# Patient Record
Sex: FEMALE | Race: AMERICAN INDIAN OR ALASKA NATIVE
[De-identification: names, ages, dates, MRNs, and addresses within clinical notes are randomized per-mention and may not be internally consistent; named-entity substitution may affect disease eponyms.]

---

## 2018-09-26 ENCOUNTER — HOSPITAL ENCOUNTER (INPATIENT)
Dept: HOSPITAL 5 - TRG | Age: 23
LOS: 3 days | Discharge: HOME | End: 2018-09-29
Attending: OBSTETRICS & GYNECOLOGY | Admitting: OBSTETRICS & GYNECOLOGY
Payer: COMMERCIAL

## 2018-09-26 DIAGNOSIS — E66.9: ICD-10-CM

## 2018-09-26 DIAGNOSIS — Z91.19: ICD-10-CM

## 2018-09-26 DIAGNOSIS — Z3A.35: ICD-10-CM

## 2018-09-26 DIAGNOSIS — D62: ICD-10-CM

## 2018-09-26 LAB
ALT SERPL-CCNC: 13 UNITS/L (ref 7–56)
BACTERIA #/AREA URNS HPF: (no result) /HPF
BASOPHILS # (AUTO): 0 K/MM3 (ref 0–0.1)
BASOPHILS NFR BLD AUTO: 0.4 % (ref 0–1.8)
BENZODIAZEPINES SCREEN,URINE: (no result)
BILIRUB UR QL STRIP: (no result)
BLOOD UR QL VISUAL: (no result)
EOSINOPHIL # BLD AUTO: 0.1 K/MM3 (ref 0–0.4)
EOSINOPHIL NFR BLD AUTO: 1.3 % (ref 0–4.3)
HCT VFR BLD CALC: 38.5 % (ref 30.3–42.9)
HGB BLD-MCNC: 13 GM/DL (ref 10.1–14.3)
LYMPHOCYTES # BLD AUTO: 3.4 K/MM3 (ref 1.2–5.4)
LYMPHOCYTES NFR BLD AUTO: 29.7 % (ref 13.4–35)
MCH RBC QN AUTO: 25 PG (ref 28–32)
MCHC RBC AUTO-ENTMCNC: 34 % (ref 30–34)
MCV RBC AUTO: 73 FL (ref 79–97)
METHADONE SCREEN,URINE: (no result)
MONOCYTES # (AUTO): 1 K/MM3 (ref 0–0.8)
MONOCYTES % (AUTO): 8.5 % (ref 0–7.3)
MUCOUS THREADS #/AREA URNS HPF: (no result) /HPF
OPIATE SCREEN,URINE: (no result)
PH UR STRIP: 5 [PH] (ref 5–7)
PLATELET # BLD: 161 K/MM3 (ref 140–440)
PROT UR STRIP-MCNC: (no result) MG/DL
RBC # BLD AUTO: 5.25 M/MM3 (ref 3.65–5.03)
RBC #/AREA URNS HPF: < 1 /HPF (ref 0–6)
URATE SERPL-MCNC: 4.9 MG/DL (ref 3.5–7.6)
UROBILINOGEN UR-MCNC: < 2 MG/DL (ref ?–2)
WBC #/AREA URNS HPF: < 1 /HPF (ref 0–6)

## 2018-09-26 PROCEDURE — 85014 HEMATOCRIT: CPT

## 2018-09-26 PROCEDURE — 87806 HIV AG W/HIV1&2 ANTB W/OPTIC: CPT

## 2018-09-26 PROCEDURE — 84550 ASSAY OF BLOOD/URIC ACID: CPT

## 2018-09-26 PROCEDURE — 86592 SYPHILIS TEST NON-TREP QUAL: CPT

## 2018-09-26 PROCEDURE — G0463 HOSPITAL OUTPT CLINIC VISIT: HCPCS

## 2018-09-26 PROCEDURE — 99211 OFF/OP EST MAY X REQ PHY/QHP: CPT

## 2018-09-26 PROCEDURE — 82565 ASSAY OF CREATININE: CPT

## 2018-09-26 PROCEDURE — 36415 COLL VENOUS BLD VENIPUNCTURE: CPT

## 2018-09-26 PROCEDURE — 76819 FETAL BIOPHYS PROFIL W/O NST: CPT

## 2018-09-26 PROCEDURE — C1765 ADHESION BARRIER: HCPCS

## 2018-09-26 PROCEDURE — 85025 COMPLETE CBC W/AUTO DIFF WBC: CPT

## 2018-09-26 PROCEDURE — 80307 DRUG TEST PRSMV CHEM ANLYZR: CPT

## 2018-09-26 PROCEDURE — 81001 URINALYSIS AUTO W/SCOPE: CPT

## 2018-09-26 PROCEDURE — 86901 BLOOD TYPING SEROLOGIC RH(D): CPT

## 2018-09-26 PROCEDURE — 86900 BLOOD TYPING SEROLOGIC ABO: CPT

## 2018-09-26 PROCEDURE — 86850 RBC ANTIBODY SCREEN: CPT

## 2018-09-26 PROCEDURE — 83615 LACTATE (LD) (LDH) ENZYME: CPT

## 2018-09-26 PROCEDURE — 84460 ALANINE AMINO (ALT) (SGPT): CPT

## 2018-09-26 PROCEDURE — 76816 OB US FOLLOW-UP PER FETUS: CPT

## 2018-09-26 PROCEDURE — 88307 TISSUE EXAM BY PATHOLOGIST: CPT

## 2018-09-26 PROCEDURE — 84450 TRANSFERASE (AST) (SGOT): CPT

## 2018-09-26 PROCEDURE — 85018 HEMOGLOBIN: CPT

## 2018-09-26 RX ADMIN — HYDROMORPHONE HYDROCHLORIDE PRN MG: 1 INJECTION, SOLUTION INTRAMUSCULAR; INTRAVENOUS; SUBCUTANEOUS at 21:10

## 2018-09-26 RX ADMIN — SODIUM CHLORIDE, SODIUM LACTATE, POTASSIUM CHLORIDE, AND CALCIUM CHLORIDE SCH MLS/HR: .6; .31; .03; .02 INJECTION, SOLUTION INTRAVENOUS at 18:47

## 2018-09-26 RX ADMIN — SODIUM CHLORIDE, SODIUM LACTATE, POTASSIUM CHLORIDE, AND CALCIUM CHLORIDE SCH MLS/HR: .6; .31; .03; .02 INJECTION, SOLUTION INTRAVENOUS at 17:04

## 2018-09-26 RX ADMIN — HYDROMORPHONE HYDROCHLORIDE PRN MG: 1 INJECTION, SOLUTION INTRAMUSCULAR; INTRAVENOUS; SUBCUTANEOUS at 20:49

## 2018-09-26 NOTE — ANESTHESIA CONSULTATION
Anesthesia Consult and Med Hx


Date of service: 09/26/18





- Airway


Anesthetic Teeth Evaluation: Good


ROM Head & Neck: Adequate


Mental/Hyoid Distance: Adequate


Mallampati Class: Class II


Intubation Access Assessment: Possibly Difficult (braces, no rubber band)





- Pulmonary Exam


CTA: Yes





- Cardiac Exam


Cardiac Exam: RRR





- Pre-Operative Health Status


ASA Pre-Surgery Classification: ASA2


Proposed Anesthetic Plan: General





- Pre-Anesthesia Comment


Pre-Anesthesia Comments: Last ate solids approx. 1 hr ago. Will plan RSI as c-

section is urgent/emergent.





- Pulmonary


Hx Smoking: No


Hx Asthma: No


COPD: No


Hx Pneumonia: No





- Cardiovascular System


Hx Hypertension: Yes (gHTN)


Hx Heart Attack/AMI: No





- Central Nervous System


Hx Neuromuscular Disorder: No


Hx Seizures: No


CVA: No


Hx Psychiatric Problems: No





- Endocrine


Hx Renal Disease: No


Hx Liver Disease: No


Hx Insulin Dependent Diabetes: No


Hx Thyroid Disease: No





- Other Systems


Hx Obesity: Yes





- Additional Comments


Anesthesia Medical History Comments: C/s for unfavorable FHT. Hx gHTN, previous 

IUFD @ 32wk. No hx anesthetic complications.

## 2018-09-26 NOTE — HISTORY AND PHYSICAL REPORT
History of Present Illness


Date of examination: 18


Chief complaint: 


 sent from office d/t nonreassuring NST @ 35wk5; hx 32week IUFD with last 

pregnancy.





History of present illness: 


EDC Confirmation:  10/26/2018 by 1st trimester u/s





Past Pregnancy History 


   :      4


   Term Births:      0


   Premature Births:   1


   Living Children:   0


   Para:      1


   Mult. Births:      0


   Prev :   0


   Prev.  attempt?   none


   Aborta:      2


   Elect. Ab:      2


   Spont. Ab:      0


   Ectopics:      0





Pregnancy # 1


   Delivery date:     


   Delivery type:     EAB


   Comments:      D & C





Pregnancy # 2


   Delivery date:     


   Delivery type:     EAB





Pregnancy # 3


   Delivery date:     06/15/2015


   Weeks Gestation:   32


   Delivery type:     


   Hours of labor:     6


   Anesthesia type:     IV


   Delivery location:     [facility]


   Birth weight:      ?


   Comments:      induction for IUFD








Risk Factors: 





Smoked Tobacco Use:  Never smoker


Smokeless Tobacco Use:  Never


Passive smoke exposure:  no


Drug use:  yes


   Substance:  marijuana


HIV high-risk behavior:  low risk


Alcohol use:  no





Dietary Counseling: pn yes








Past Medical History:


   None


   Negative Past Medical History


Family History Summary: 


   Reviewed history and no changes required: 2018


Mother (biol.) - Has Family History of Hypertension - Entered On: 2015


Sister (full) - Has Family History of Cervical Cancer - Entered On: 2015


Other family member - Has No Family History of Breast Cancer - Entered On: 2015


Other family member - Has No Family History of Colon Cancer - Entered On: 2015


Other family member - Has No Family History of Ovarvian Cancer - Entered On: 2015


Other family member - Has No Family History of DVT/PE on OCP - Entered On: 2015








Social History:


   Patient is single


   Works at [employer]


   


   Smoking History:


   Patient has never smoked.








Past Medical History 


Abnormal PAP: negative


RITO Exposure: negative


Infertility: negative


Uterine Anomaly: negative


Uterine Surgery (not C/S): positive, D & C


Other Gynecologic Problems: negative





Social Hx: Patient is single


Works at [employer]





Smoking History:


Patient has never smoked.








Infection History 


Hx of STD: chlamydia


HIV Risk Eval: low risk


Hepatitis B Risk Eval: low risk


Personal hx. of genital herpes: no





Genetic History 


 Congenital Heart Defect:


    Mom: no  Dad: no


Canavan Disease:


    Mom: no  Dad: no


Thalassemia


    Mom: no  Dad: no


Neural Tube Defect


    Mom: no  Dad: no


Down's Syndrome


    Mom: no  Dad: no


Walt-Sachs


    Mom: no  Dad: no


Sickle Cell Disease/Trait


    Mom: no  Dad: no


Hemophilia


    Mom: no  Dad: no


Muscular Dystrophy


    Mom: no  Dad: no


Cystic Fibrosis


    Mom: no  Dad: no


Anshul Chorea


    Mom: no  Dad: no


Mental Retardation


    Mom: no  Dad: no


Fragile X


    Mom: no  Dad: no


Other Genetic/Chromosomal Disorder


    Mom: no  Dad: no


Child w/other birth defect


    Mom: no  Dad: no





Enviromental Exposures 


Xray Exposure: no


Chemical/Other Exposure: no


Exposure to Cat Liter: no


Hx of Parvovirus (Fifth Disease): no


FALSECurrent Allergies (reviewed today):


* BEE STINGS (Critical)








Past History


Past Medical History: other (see HPI)


Past Surgical History: other (see HPI)


GYN History: other (see HPI)


Family/Genetic History: other (see HPI)


Social history: single





- Obstetrical History


Expected Date of Delivery: 10/26/18


Actual Gestation: 35 Week(s) 5 Day(s) 


: 4


Para: 1


Hx # Term Pregnancies: 0


Number of  Pregnancies: 1 (32 week IUFD)


Spontaneous Abortions: 0


Induced : 2


Number of Living Children: 0





Medications and Allergies


 Allergies











Allergy/AdvReac Type Severity Reaction Status Date / Time


 


bee pollen Allergy  Swelling Verified 18 13:52











 Home Medications











 Medication  Instructions  Recorded  Confirmed  Last Taken  Type


 


No Known Home Medications [No  18 Unknown History





Reported Home Medications]     











Active Meds: 


Active Medications





Lactated Ringer's (Lactated Ringers)  1,000 mls @ 125 mls/hr IV AS DIRECT HAYDEN


Lactated Ringer's (Lactated Ringers)  1,000 mls @ 999 mls/hr IV BOLUS ONE


   Stop: 18 14:10











Review of Systems


All systems: negative





- Vital Signs


Vital signs: 


 Vital Signs











Pulse BP


 


 77   120/69 


 


 18 02:20  18 02:20








 











Temp Pulse Resp BP Pulse Ox


 


    56 L     191/101   100 


 


    18 13:46     18 13:46  18 13:42














- Physical Exam


Breasts: Positive: normal


Cardiovascular: Regular rate


Lungs: Positive: Clear to auscultation, Normal air movement


Abdomen: Positive: normal appearance, soft


Genitourinary (Female): Positive: normal external genitalia, normal perenium


Vulva: both: normal


Vagina: Positive: normal moisture


Uterus: Positive: normal size, normal contour


Anus/Rectum: Positive: normal perianal skin


Extremities: Positive: normal


Deep Tendon Reflex Grade: Normal +2





- Obstetrical


FHR: category 2


Uterine Contraction Monitor Mode: External


Uterine Contraction Pattern: Irregular


Uterine Tone Measurement Phase: Resting


Uterine Contraction Intensity: Mild





Results


Result Diagrams: 


 18 13:00





 18 14:18


All other labs normal.








Assessment and Plan


 24y/o  @ 35+5 weeks admitted for non reassuring FHT. GBS pending. elevated 

b/p noted in triage - pre-e labs normal with only trace proteinuria. BPP 8/8, 

vertex, waiting on efw/ramírez. Plan to admit for further observation with 

continuous monitoring. 








- Patient Problems


(1) 35 weeks gestation of pregnancy


Current Visit: Yes   Status: Acute   





(2) History of IUFD


Current Visit: Yes   Status: Acute   





(3) Non-reassuring electronic fetal monitoring tracing


Current Visit: Yes   Status: Acute   





(4) Noncompliant pregnant patient in third trimester


Current Visit: Yes   Status: Acute   


Plan to address problem: 


Patient has missed several visits to [facility] and our office d/t transportation 

issures. Last visit to [facility] 18 - EFW 12th% (3lbs 14oz) RAMÍREZ 11.








(5) Elevated blood pressure affecting pregnancy in third trimester, antepartum


Current Visit: Yes   Status: Acute

## 2018-09-26 NOTE — EVENT NOTE
Date: 18





FHT's reviewed, minimum variability, suspicious decels, occasional lates. No 

accel with scalp stimulation, cervix /3, posterior, medium. BPP 8/10 

however with nonreassuring FHT's, history of 32 week IUFD, noncompliance in 

this pregnancy and remote from delivery with unfavorable cervix will proceed 

delivery because risks for poor, fatal outcome is higher than risks associated 

with prematurity. Options for delivery discussed, risks explained, doubt fetus 

will tolerate labor, recommend proceeding c/s. Risks explained, questions 

answered, consents reviewed and signed, she voiced understanding an agrees to 

proceed with  delivery

## 2018-09-26 NOTE — ULTRASOUND REPORT
FINAL REPORT



EXAM:  US OB FETAL BPP WO NON-STRESS



HISTORY:  efw/aiyana/bpp 



TECHNIQUE:  Transabdominal sonography of the pelvis.



PRIORS:  None.



FINDINGS:  

Biophysical profile:



Fetal breathing movements: 2/2



Fetal movements: 2/2



Fetal posture and tone: 2/2



Qualitative amniotic fluid volume: 2/2



Total: 8/8



Fetal heart rate 144 beats per minute. 



IMPRESSION:  

1. Biophysical profile as noted above.

## 2018-09-26 NOTE — EVENT NOTE
Date: 09/26/18





Sent from office for suspicious deceleration on NST. Late deceleration noted 

with initial contractions however resolved with IVF hydration, states she has 

not eaten since last pm, BPP in progress, elevated BP's noted.  Plan of care to 

be determine after BPP completed: induction vs evaluation for elevated BP's

## 2018-09-26 NOTE — OPERATIVE REPORT
Operative Report


Operative Report: 





Date:      2018





Preoperative diagnosis:   1.  Intrauterine pregnancy at 35 weeks


         2.  Nonreassuring fetal heart rate tracing, category 3 fetal heart 

tones


      `   3.  History of 32 week IUFD


         4.  Noncompliance


         5.  Gestational hypertension





Postoperative diagnosis:   1.  Intrauterine pregnancy at 35 weeks


         2.  Nonreassuring fetal heart rate tracing, category 3 fetal heart 

tones


      `   3.  History of 32 week IUFD


         4.  Noncompliance


         5.  Gestational hypertension





Procedure:      Low uterine transverse incision for  delivery





Surgeon:                      Tamara Almendarez MD





Assistant:      MERLY Betancourt CNM





Anesthesia:      Gen. endotracheal anesthesia





Anesthesiologist:   Dr. Francis





Estimated blood loss:   600 mL





Urine out:      50 mL





Findings:      Live born male  infant.  Weight 4 lbs. 10 oz.  Apgars 8 at 1 

minute and 9 at 5 minutes.  Uterus grossly normal, tubes grossly normal, 

ovaries grossly normal.  Placenta appeared to be a grade 3 with calcifications 

and small clots. 





Procedure:      After risk, benefits, complications, consequences and 

alternatives for this procedure were discussed with patient and consents were 

reviewed and signed, she was taken to the OR where general endotracheal 

anesthesia was induced.  She was then placed in the left lateral tilt position, 

and prepped and draped in the usual sterile fashion.  Timeout was performed, 

and an appropriate level of anesthesia was noted, a Pfannenstiel incision was 

made and extended to the fascia which was incised and extended in the lateral 

directions.  The overlying fascia was sharply dissected away from the 

underlying rectus muscles in the superior and inferior directions.  The midline 

was entered bluntly.  The vesicouterine fold was incised and with blunt 

dissection the bladder flap was created.  A transverse incision was made in the 

lower uterine segment and extended in superiolateral direction with finger 

fractionation.  Copious clear fluid was noted.  The infant was delivered from 

cephalic position.  Mouth and nose were bulb suctioned.  Spontaneous cry and 

excellent tone were noted.  Cord was doubly clamped and cut.  The infant was 

given to /resuscitation team present.  The placenta was manually 

extracted.  The uterus was then exteriorized and cleared of any further 

products of conception or placental tissue.  The incision was reapproximated 

using 0 Vicryl in a running interlocking stitch.  Grossly normal uterus, tubes 

and ovaries were noted.  Once hemostasis was noted, the uterus was allowed back 

into the pelvic cavity.  The pelvis was irrigated with warm normal saline.  

Again hemostasis was noted .  Surgicel applied for further hemostasis.  

Interceed was then placed to prevent adhesions.  Then attention was turned to 

the rectus muscles.  The rectus muscles reapproximated using 0 Vicryl in a 

simple interrupted stitch x 3. Once hemostasis was noted,  the fascia was 

reapproximated using 0 Vicryl  running stitch fashion.  Once hemostasis was 

noted skin incision was reapproximated using 4-0 Vicryl on a Rogelio needle in a 

subcuticular manner.





Counts were correct 3.  Patient tolerated procedure well state recovery room 

in stable condition.

## 2018-09-26 NOTE — EVENT NOTE
Date: 09/26/18


 Spoke with UMBERTO Oliveira - states patient is continuing to have late decels with 

ctx despite IVF bolus in progress and o2 via facemask. Pre-e labs drawn. Dr. Almendarez made aware and will go view tracing.

## 2018-09-27 LAB
HCT VFR BLD CALC: 28.9 % (ref 30.3–42.9)
HGB BLD-MCNC: 9.9 GM/DL (ref 10.1–14.3)

## 2018-09-27 RX ADMIN — KETOROLAC TROMETHAMINE SCH MG: 30 INJECTION, SOLUTION INTRAMUSCULAR at 00:17

## 2018-09-27 RX ADMIN — KETOROLAC TROMETHAMINE SCH MG: 30 INJECTION, SOLUTION INTRAMUSCULAR at 14:47

## 2018-09-27 RX ADMIN — CEFAZOLIN SCH MLS/HR: 330 INJECTION, POWDER, FOR SOLUTION INTRAMUSCULAR; INTRAVENOUS at 01:15

## 2018-09-27 RX ADMIN — OXYCODONE AND ACETAMINOPHEN PRN TAB: 5; 325 TABLET ORAL at 08:37

## 2018-09-27 RX ADMIN — OXYCODONE AND ACETAMINOPHEN PRN TAB: 5; 325 TABLET ORAL at 21:47

## 2018-09-27 RX ADMIN — OXYCODONE AND ACETAMINOPHEN PRN TAB: 5; 325 TABLET ORAL at 14:47

## 2018-09-27 RX ADMIN — CEFAZOLIN SCH MLS/HR: 330 INJECTION, POWDER, FOR SOLUTION INTRAMUSCULAR; INTRAVENOUS at 22:00

## 2018-09-27 NOTE — PROGRESS NOTE
Assessment and Plan





- Patient Problems


(1)  delivery delivered


Onset Date: ~18   Current Visit: Yes   Status: Acute   


Plan to address problem: 


Pt awake No c/o voiced BP post c/s 140-130/90-80 Afebrile Pt has been OOB to 

toilet FF @ umb Lochia small Dressing D&I H&H pending Doing well 12hr s/p 

 section P: continue pathway Advance diet and activity as tolerated.








Subjective





- Subjective


Date of service: 18 (resting; no voiced c/o)


Principal diagnosis: Day 1 (12hours) s/p primary  section


Patient reports: voiding normally, pain well controlled, ambulating normally


: doing well





Objective





- Vital Signs


Latest vital signs: 


 Vital Signs











  Temp Pulse Resp BP BP Pulse Ox


 


 18 02:00  98.5 F  82  18   120/80 


 


 18 00:47    16   


 


 18 00:17    20   


 


 18 22:00  98.7 F  84  20   130/80 


 


 18 21:24   109 H  19   135/87  97


 


 18 21:10    18   


 


 18 21:09  98.8 F  109 H  18   137/92  97


 


 18 20:54   105 H  16   146/91  97


 


 18 20:49    17   


 


 18 20:39   101 H  22   134/88  99


 


 18 20:24   109 H  17   142/86 


 


 18 20:09  97.6 F  105 H  16   143/92  98


 


 18 18:08   106 H     79 L


 


 18 18:06   93 H   128/79  


 


 18 18:05   94 H     99


 


 18 18:00   74     100


 


 18 17:55   106 H     99


 


 18 17:50   63     99


 


 18 17:49   66     87


 


 18 17:47   90   120/74  


 


 18 17:45   85     99


 


 18 17:40   80     99


 


 18 17:35   74     98


 


 18 17:30   64     98


 


 18 17:27   69   129/82  


 


 18 17:25   89     99


 


 18 17:20   74     97


 


 18 17:15   74     99


 


 18 17:10   76     98


 


 18 17:07   63   155/94  


 


 18 17:05   60     98


 


 18 17:00   55 L     99


 


 18 16:55   66     99


 


 18 16:50   57 L     99


 


 18 16:47   54 L   136/88  


 


 18 16:45   56 L     99


 


 18 16:40   51 L     99


 


 18 16:36   51 L   156/95  


 


 18 16:35   54 L     99


 


 18 16:30   54 L     99


 


 18 16:26   54 L   175/102  


 


 18 16:25   55 L     98


 


 18 16:22  96.7 F L     


 


 18 16:15   56 L     100


 


 18 16:10   54 L     99


 


 18 16:05   61     99


 


 18 16:00   54 L     99


 


 18 15:55   55 L     99


 


 18 15:50   56 L     99


 


 18 15:45   65     100


 


 18 15:40   54 L     100


 


 18 15:35   52 L     99


 


 18 15:30   54 L     100


 


 18 15:25   52 L     99


 


 18 15:07   65     99


 


 18 15:02   61     98


 


 18 14:57   53 L     99


 


 18 14:52   57 L     99


 


 18 14:47   59 L     99


 


 18 14:42   67     99


 


 18 14:32   59 L     99


 


 18 14:27   61     99


 


 18 14:22   54 L     99


 


 18 14:17   57 L     100


 


 18 14:12   57 L     99


 


 18 14:07   57 L     100


 


 18 14:02   59 L     99


 


 18 14:00   61   169/94  


 


 18 13:57   61     99


 


 18 13:52   57 L     100


 


 18 13:48   56 L   175/86  


 


 18 13:47   58 L     100


 


 18 13:46   56 L   191/101  


 


 18 13:42   75     100


 


 18 13:37   52 L     100


 


 18 13:32   66     100


 


 18 13:27   60     100


 


 18 13:15   60     99


 


 18 13:10   58 L     99


 


 18 13:05   56 L     98


 


 18 13:00   63     99


 


 18 12:55   59 L     98


 


 18 12:50   59 L   152/92   99


 


 18 12:45   55 L     99


 


 18 12:40   54 L     99


 


 18 12:35   54 L     98


 


 18 12:30   53 L     98








 Intake and Output











 18





 14:59 22:59 06:59


 


Intake Total  2514.583 


 


Output Total  100 700


 


Balance  2414.583 -700


 


Intake:   


 


  IV  2514.583 


 


    Lactated Ringers 1,000 ml  214.583 





    @ 125 mls/hr IV AS   





    DIRECT HAYDEN Rx#:[id number]   


 


    Lactated Ringers 1,000 ml  1000 





    @ 999 mls/hr IV BOLUS   





    ONE Rx#:[id number]   


 


Output:   


 


  Urine  100 700


 


    Indwelling Catheter   700


 


Other:   


 


  Total, Output Amount   200


 


  Weight 179 lb  


 


  Estimated Blood Loss  600 








 Patient Weight











 18





 06:59


 


Weight 179 lb














- Exam


Breasts: Present: normal


Cardiovascular: Present: Regular rate


Lungs: Present: Normal air movement


Abdomen: Present: normal appearance, soft, normal bowel sounds


Uterus: Present: normal, firm, fundal height at umbilicus


Extremities: Present: normal, edema (LE bilateral)


Incision: Present: normal, dry, intact, dressed (to be removed)





- Labs


Labs: 


 Abnormal lab results











  18 Range/Units





  13:00 14:18 


 


WBC  11.6 H   (4.5-11.0)  K/mm3


 


RBC  5.25 H   (3.65-5.03)  M/mm3


 


MCV  73 L   (79-97)  fl


 


MCH  25 L   (28-32)  pg


 


RDW  15.3 H   (13.2-15.2)  %


 


Mono % (Auto)  8.5 H   (0.0-7.3)  %


 


Mono #  1.0 H   (0.0-0.8)  K/mm3


 


Creatinine   0.5 L  (0.7-1.2)  mg/dL


 


Lactate Dehydrogenase   231 H  ()  units/L

## 2018-09-27 NOTE — QUERY-ANEMIA
Dear _Wilder_______________  Date:_____18_______   



/CDS:____jose___________  Phone#:___[phone]______



Exercise your independent professional judgment when responding to this  query.
  

Questions asked do not imply a particular answer is desired or expected. We 
greatly appreciate your clarification on this issue.           

Clinical Documentation States:

Operative Report: 



Date:      2018



Preoperative diagnosis:   1.  Intrauterine pregnancy at 35 weeks

         2.  Nonreassuring fetal heart rate tracing, category 3 fetal heart 
tones

      `   3.  History of 32 week IUFD

         4.  Noncompliance

         5.  Gestational hypertension



Postoperative diagnosis:   1.  Intrauterine pregnancy at 35 weeks

         2.  Nonreassuring fetal heart rate tracing, category 3 fetal heart 
tones

      `   3.  History of 32 week IUFD

         4.  Noncompliance

         5.  Gestational hypertension



Procedure:      Low uterine transverse incision for  delivery



Surgeon:                      Tamara Almendarez MD



Assistant:      MERLY Betancourt CNM



Anesthesia:      Gen. endotracheal anesthesia



Anesthesiologist:   Dr. Francis



Estimated blood loss:   600 mL

     

 



  

Clinical Findings Show:



                                           18



Hb                                         13.0                    9.9



Hct                                        38.5                  28.9         



Etiology:

  [x ]   Anemia due to acute blood loss                                        
                                                                               
                                                       

  [ ]   Anemia due to chronic blood loss 

  [ ]   Anemia secondary to ESRD

  [ ]   Anemia  secondary to neoplastic disease

  [ ]   Iron deficiency anemia due to malabsorption

  [ ]   GI Bleed from:_________________

  [ ]   Anemia of chronic disease ,Other:_____________

  [ x]   Precipitous  Drop in Hemoglobin

  [x ]   Precipitous Drop in Hematocrit 

  [ ]   Other:____________________

  [ ]   Unable to determine

  [ ]   Comment/Explanation:____________







Present on Admission:   [ ] Yes (Y)    [ ] Clinically undeterminable (W)    [x 
] No (N) 



Please also document response in your Progress Notes and/or Discharge Summary 
and 

indicate if the condition was present on admission.
GRACIA

## 2018-09-28 RX ADMIN — OXYCODONE AND ACETAMINOPHEN PRN TAB: 5; 325 TABLET ORAL at 05:42

## 2018-09-28 RX ADMIN — OXYCODONE AND ACETAMINOPHEN PRN TAB: 5; 325 TABLET ORAL at 18:41

## 2018-09-28 RX ADMIN — OXYCODONE AND ACETAMINOPHEN PRN TAB: 5; 325 TABLET ORAL at 11:57

## 2018-09-28 RX ADMIN — IBUPROFEN PRN MG: 800 TABLET, FILM COATED ORAL at 00:34

## 2018-09-28 RX ADMIN — IBUPROFEN PRN MG: 800 TABLET, FILM COATED ORAL at 18:42

## 2018-09-28 NOTE — EVENT NOTE
Date: 09/28/18


Placenta pathology report reviewed, NICU Charge Nurse Simran informed and 

advised to have neonatology review path report for potential sequela for 

infant. CN states she would show MD.

## 2018-09-28 NOTE — PROGRESS NOTE
Assessment and Plan


 patient doing well, no complaints. incision dressed (not removed per patient 

request, wants to wait for shower this morning) - dressing dry and intact.  

lochia scant, fundus firm, H&H stable 9.9/28.9. Patient reports walking to NICU 

several times per day to see infant. encouraged pumping breast q3h and adequate 

hydration. Patient requesting d/c home tomorrow. 








- Patient Problems


(1)  delivery delivered


Onset Date: ~18   Current Visit: Yes   Status: Acute   





Subjective





- Subjective


Date of service: 18


Principal diagnosis: Day 2 s/p primary  section


Interval history: 


EDC Confirmation:  10/26/2018 by 1st trimester u/s





Past Pregnancy History 


   :      4


   Term Births:      0


   Premature Births:   1


   Living Children:   0


   Para:      1


   Mult. Births:      0


   Prev :   0


   Prev.  attempt?   none


   Aborta:      2


   Elect. Ab:      2


   Spont. Ab:      0


   Ectopics:      0





Pregnancy # 1


   Delivery date:     


   Delivery type:     EAB


   Comments:      D & C





Pregnancy # 2


   Delivery date:     


   Delivery type:     EAB





Pregnancy # 3


   Delivery date:     06/15/2015


   Weeks Gestation:   32


   Delivery type:     


   Hours of labor:     6


   Anesthesia type:     IV


   Delivery location:     [facility]


   Birth weight:      ?


   Comments:      induction for IUFD








Risk Factors: 





Smoked Tobacco Use:  Never smoker


Smokeless Tobacco Use:  Never


Passive smoke exposure:  no


Drug use:  yes


   Substance:  marijuana


HIV high-risk behavior:  low risk


Alcohol use:  no





Dietary Counseling: pn yes








Past Medical History:


   None


   Negative Past Medical History


Family History Summary: 


   Reviewed history and no changes required: 2018


Mother (biol.) - Has Family History of Hypertension - Entered On: 2015


Sister (full) - Has Family History of Cervical Cancer - Entered On: 2015


Other family member - Has No Family History of Breast Cancer - Entered On: 2015


Other family member - Has No Family History of Colon Cancer - Entered On: 2015


Other family member - Has No Family History of Ovarvian Cancer - Entered On: 2015


Other family member - Has No Family History of DVT/PE on OCP - Entered On: 2015








Social History:


   Patient is single


   Works at [employer]


   


   Smoking History:


   Patient has never smoked.








Past Medical History 


Abnormal PAP: negative


RITO Exposure: negative


Infertility: negative


Uterine Anomaly: negative


Uterine Surgery (not C/S): positive, D & C


Other Gynecologic Problems: negative





Social Hx: Patient is single


Works at [employer]





Smoking History:


Patient has never smoked.








Infection History 


Hx of STD: chlamydia


HIV Risk Eval: low risk


Hepatitis B Risk Eval: low risk


Personal hx. of genital herpes: no





Genetic History 


 Congenital Heart Defect:


    Mom: no  Dad: no


Canavan Disease:


    Mom: no  Dad: no


Thalassemia


    Mom: no  Dad: no


Neural Tube Defect


    Mom: no  Dad: no


Down's Syndrome


    Mom: no  Dad: no


Walt-Sachs


    Mom: no  Dad: no


Sickle Cell Disease/Trait


    Mom: no  Dad: no


Hemophilia


    Mom: no  Dad: no


Muscular Dystrophy


    Mom: no  Dad: no


Cystic Fibrosis


    Mom: no  Dad: no


Cole Chorea


    Mom: no  Dad: no


Mental Retardation


    Mom: no  Dad: no


Fragile X


    Mom: no  Dad: no


Other Genetic/Chromosomal Disorder


    Mom: no  Dad: no


Child w/other birth defect


    Mom: no  Dad: no





Enviromental Exposures 


Xray Exposure: no


Chemical/Other Exposure: no


Exposure to Cat Liter: no


Hx of Parvovirus (Fifth Disease): no


FALSECurrent Allergies (reviewed today):


* BEE STINGS (Critical)





Patient reports: appetite normal, voiding normally, pain well controlled, flatus

, ambulating normally, no dizzy ambulation, no nauseated


[place]: in NICU





Objective





- Vital Signs


Latest vital signs: 


 Vital Signs











  Temp Pulse Resp BP Pulse Ox


 


 18 05:42    18  


 


 18 01:34    16  


 


 18 00:34    16  


 


 18 00:32  98.6 F  77  20  127/71  95


 


 18 22:47    18  


 


 18 21:47    18  








 Intake and Output











 18





 23:59 07:59 15:59


 


Other:   


 


  # Voids   


 


    Indwelling Catheter 1  














- Exam


Breasts: Present: normal


Cardiovascular: Present: Regular rate


Lungs: Present: Clear to auscultation, Normal air movement


Abdomen: Present: normal appearance, soft


Vulva: both: normal


Uterus: Present: normal, firm, fundal height at umbilicus


Extremities: Present: normal


Incision: Present: normal, dry, dressed (request to remove dressing in shower 

this morning. )

## 2018-09-29 VITALS — DIASTOLIC BLOOD PRESSURE: 80 MMHG | SYSTOLIC BLOOD PRESSURE: 130 MMHG

## 2018-09-29 RX ADMIN — OXYCODONE AND ACETAMINOPHEN PRN TAB: 5; 325 TABLET ORAL at 03:04

## 2018-09-29 RX ADMIN — IBUPROFEN PRN MG: 800 TABLET, FILM COATED ORAL at 03:06

## 2018-09-29 NOTE — DISCHARGE SUMMARY
Providers





- Providers


Date of Admission: 


18 15:50





Date of discharge: 18


Attending physician: 


STACEY MAZARIEGOS





 





18 22:30


Consult to Lactation Consultant [CONS] Routine 


   Reason For Exam: 











Primary care physician: 


YOLI MÁRQUEZ








Hospitalization


Reason for admission:  section,  labor, other (see H&P and Op 

note)


Delivery: 


Procedure:  section


Incision: normal, dry, intact


Other postpartum procedures: none


Discharge diagnosis:  delivery


Hospital course: 


See notes


No complaints, minimal bleeding, fundus firm below umbilicus. Lungs CTAB, abd 

soft NT, exts no edema,NT'


Patient desires d/c home today


The placental pathology report was explained to her.


Condition at discharge: Good


Disposition: DC-01 TO HOME OR SELFCARE





Plan





- Discharge Medications


Prescriptions: 


Ibuprofen [Motrin 800 MG tab] 800 mg PO TID PRN #30 tablet


 PRN Reason: Pain


Lidocain2.5%/Prilocai2.5% [Emla] 5 gm TP ONCE #1 tube


oxyCODONE /ACETAMINOPHEN [Percocet 5/325 mg] 1 - 2 tab PO Q4HR PRN #30 tablet


 PRN Reason: Pain





- Provider Discharge Summary


Activity: routine, no sex for 6 weeks, no heavy lifting 4 weeks, no strenuous 

exercise, other (check you blood pressure twice a day, call the office if the 

top number is 160 or greater or the bottom number is 100 or greater; any 

problems or concerns. )


Diet: routine


Additional instructions: 


[]  Smoking cessation referral if applicable(refer to patient education folder 

for contact #)


[]  Refer to [facility] Booklet








Call your doctor immediately for:


* Fever > 100.5


* Heavy vaginal bleeding ( >1 pad per hour)


* Severe persistent headache


* Shortness of breath


* Reddened, hot, painful area to leg or breast


* Drainage or odor from incision.





* Keep incision clean and dry at all times and follow doctor's instructions 

regarding bathing/showering





You may have a condition that causes you to develop clots in your legs or lungs 

that could kill you and also may be the cause of the previous stillborn and 

complications associated with this pregnancy. You must be tested to determine 

if you have a coagulopathy prior to taking hormonal contraception or getting 

pregnant again.  





- Follow up plan


Follow up: 


YOLI MÁRQUEZ MD [Primary Care Provider] - 7 Days

## 2020-01-08 ENCOUNTER — HOSPITAL ENCOUNTER (EMERGENCY)
Dept: HOSPITAL 5 - ED | Age: 25
Discharge: HOME | End: 2020-01-08
Payer: COMMERCIAL

## 2020-01-08 VITALS — DIASTOLIC BLOOD PRESSURE: 78 MMHG | SYSTOLIC BLOOD PRESSURE: 126 MMHG

## 2020-01-08 DIAGNOSIS — Z91.030: ICD-10-CM

## 2020-01-08 DIAGNOSIS — Z79.899: ICD-10-CM

## 2020-01-08 DIAGNOSIS — F17.200: ICD-10-CM

## 2020-01-08 DIAGNOSIS — I10: ICD-10-CM

## 2020-01-08 DIAGNOSIS — J10.1: Primary | ICD-10-CM

## 2020-01-08 DIAGNOSIS — Z98.890: ICD-10-CM

## 2020-01-08 DIAGNOSIS — Z79.1: ICD-10-CM

## 2020-01-08 LAB
BACTERIA #/AREA URNS HPF: (no result) /HPF
BILIRUB UR QL STRIP: (no result)
BLOOD UR QL VISUAL: (no result)
MUCOUS THREADS #/AREA URNS HPF: (no result) /HPF
PH UR STRIP: 5 [PH] (ref 5–7)
RBC #/AREA URNS HPF: 3 /HPF (ref 0–6)
UROBILINOGEN UR-MCNC: < 2 MG/DL (ref ?–2)
WBC #/AREA URNS HPF: 2 /HPF (ref 0–6)

## 2020-01-08 PROCEDURE — 81001 URINALYSIS AUTO W/SCOPE: CPT

## 2020-01-08 PROCEDURE — 81025 URINE PREGNANCY TEST: CPT

## 2020-01-08 PROCEDURE — 71046 X-RAY EXAM CHEST 2 VIEWS: CPT

## 2020-01-08 RX ADMIN — BENZONATATE ONE MG: 100 CAPSULE ORAL at 15:09

## 2020-01-08 RX ADMIN — IBUPROFEN ONE MG: 800 TABLET, FILM COATED ORAL at 13:14

## 2020-01-08 NOTE — XRAY REPORT
CHEST 2 VIEWS 



INDICATION / CLINICAL INFORMATION:

cough.



COMPARISON: 

None available.



FINDINGS:



SUPPORT DEVICES: None.



HEART / MEDIASTINUM: No significant abnormality. 



LUNGS / PLEURA: No significant pulmonary or pleural abnormality. No pneumothorax. 



ADDITIONAL FINDINGS: No significant additional findings.



IMPRESSION:

1. No acute findings.



Signer Name: Jefe Garg MD 

Signed: 1/8/2020 1:00 PM

 Workstation Name: [id number]

## 2020-01-08 NOTE — EMERGENCY DEPARTMENT REPORT
Upper Respiratory HPI





- HPI


Duration: 1 week


URI Symptoms: Rhinorrhea: Yes, Sore Throat: No, Ear Pain: No, Cough: Yes, 

Shortness of Breath: No, Sick Contacts: No, Unable to Take Fluids: No, Urine 

Output Abnormal: No, Listless Behavior: No


Other History: This is a 25-year-old female nontoxic, well nourished in 

appearance, no acute signs of distress presents to the ED with c/o of productive

cough, chills, body aches, rhinorrhea, nasal congestion x1 week.  Patient also 

has a secondary complaint of dysuria and foul odor of urine.  Patient denies any

flank pain or back pain.  Denies any other urinary symptoms.  Denies any 

abdominal or pelvic pain.  Patient describes productive cough as yellow mucus 

production.  Patient agrees to sick contact with child which has the flu.  

Patient denies any recent travels, long car, recent hospital stays.  Patient 

denies any calf pain or calf tenderness.  Patient denies any chest pain, short 

of breath, fever, nausea, vomiting, hemoptysis, numbness, tingling, headache or 

stiff neck.  Patient denies any drug allergies or significant past medical 

history.





<EUSEBIO RODRIGUEZ - Last Filed: 20 14:32>





<JAZZ CUELLO - Last Filed: 01/10/20 07:31>





- HPI


Chief Complaint: Upper Respiratory Infection


Stated Complaint: BODY ACHES/CHILLS


Time Seen by Provider: 20 12:20





- Home Meds and Allergies


Home Medications: 


                                  Previous Rx's











 Medication  Instructions  Recorded  Last Taken  Type


 


Ibuprofen [Motrin 800 MG tab] 800 mg PO TID PRN #30 tablet 18 Unknown Rx


 


Lidocain2.5%/Prilocai2.5% [Emla] 5 gm TP ONCE #1 tube 18 Unknown Rx


 


oxyCODONE /ACETAMINOPHEN [Percocet 1 - 2 tab PO Q4HR PRN #30 tablet 18 

Unknown Rx





5/325 mg]    


 


Benzonatate [Tessalon Perles] 100 mg PO Q8HR PRN #20 capsule 20 Unknown Rx


 


Ibuprofen [Motrin] 600 mg PO Q8H PRN #20 tablet 20 Unknown Rx


 


Sulfamethoxazole/Trimethoprim 1 each PO BID #14 tablet 20 Unknown Rx





[Bactrim DS TAB]    











Allergies/Adverse Reactions: 


                                    Allergies











Allergy/AdvReac Type Severity Reaction Status Date / Time


 


bee pollen Allergy  Swelling Verified 18 13:52














ED Review of Systems


ROS: 


Stated complaint: BODY ACHES/CHILLS


Other details as noted in HPI





Constitutional: chills.  denies: fever


Eyes: denies: eye pain, eye discharge, vision change


ENT: congestion.  denies: ear pain, throat pain


Respiratory: cough.  denies: shortness of breath, wheezing


Cardiovascular: denies: chest pain, palpitations


Endocrine: no symptoms reported


Gastrointestinal: denies: abdominal pain, nausea, vomiting, diarrhea


Genitourinary: urgency, dysuria, frequency.  denies: hematuria, discharge


Musculoskeletal: denies: back pain, joint swelling, arthralgia


Skin: denies: rash, lesions


Neurological: denies: headache, weakness, paresthesias


Psychiatric: denies: anxiety, depression


Hematological/Lymphatic: denies: easy bleeding, easy bruising





<EUSEBIO RODRIGUEZ - Last Filed: 20 14:32>


ROS: 


Stated complaint: BODY ACHES/CHILLS


Other details as noted in HPI








<JAZZ CUELLO P - Last Filed: 01/10/20 07:31>





ED Past Medical Hx





- Past Medical History


Previous Medical History?: Yes


Hx Hypertension: Yes ([place])


Hx Heart Attack/AMI: No


Hx Congestive Heart Failure: No


Hx Diabetes: No


Hx Deep Vein Thrombosis: No


Hx Liver Disease: No


Hx Renal Disease: No


Hx Sickle Cell Disease: No


Hx Seizures: No


Hx Asthma: No


Hx COPD: No


Hx HIV: No





- Surgical History


Past Surgical History?: Yes


Additional Surgical History:  x 2-3.  





- Social History


Smoking Status: Current Every Day Smoker


Substance Use Type: None





<EUSEBIO RODRIGUEZ - Last Filed: 20 14:32>





<JAZZ CUELLO P - Last Filed: 01/10/20 07:31>





- Medications


Home Medications: 


                                Home Medications











 Medication  Instructions  Recorded  Confirmed  Last Taken  Type


 


Ibuprofen [Motrin 800 MG tab] 800 mg PO TID PRN #30 tablet 18  Unknown Rx


 


Lidocain2.5%/Prilocai2.5% [Emla] 5 gm TP ONCE #1 tube 09/26/18  Unknown Rx


 


oxyCODONE /ACETAMINOPHEN [Percocet 1 - 2 tab PO Q4HR PRN #30 tablet 18  

Unknown Rx





5/325 mg]     


 


Benzonatate [Tessalon Perles] 100 mg PO Q8HR PRN #20 capsule 20  Unknown 

Rx


 


Ibuprofen [Motrin] 600 mg PO Q8H PRN #20 tablet 20  Unknown Rx


 


Sulfamethoxazole/Trimethoprim 1 each PO BID #14 tablet 20  Unknown Rx





[Bactrim DS TAB]     














ED Bronchiolitis Physical Exam





- Exam


General: 


Vital signs noted. No distress. Alert and acting appropriately.





Neurologic: 


Alert and oriented, no deficits.








Musculoskeletal: 


Unremarkable.











<EUSEBIO RODRIGUEZ - Last Filed: 20 14:32>





- Exam


General: 


Vital signs noted. No distress. Alert and acting appropriately.





Neurologic: 


Alert and oriented, no deficits.








Musculoskeletal: 


Unremarkable.











<JAZZ CUELLO - Last Filed: 01/10/20 07:31>





ED Bronchiolitis Tests





- Testing


Testing: CXR: Normal/Negative





<EUSEBIO RODRIGUEZ - Last Filed: 20 14:32>





ED Physical Exam





- General


Limitations: No Limitations


General appearance: alert, in no apparent distress





- Head


Head exam: Present: atraumatic, normocephalic





- Neck


Neck exam: Present: normal inspection, full ROM.  Absent: tenderness, 

meningismus, lymphadenopathy





- Respiratory


Respiratory exam: Present: normal lung sounds bilaterally.  Absent: respiratory 

distress, wheezes, rales, rhonchi, stridor, chest wall tenderness, accessory 

muscle use, decreased breath sounds, prolonged expiratory





- Cardiovascular


Cardiovascular Exam: Present: regular rate, normal rhythm, normal heart sounds. 

Absent: bradycardia, tachycardia, irregular rhythm, systolic murmur, diastolic 

murmur, rubs, gallop





- GI/Abdominal


GI/Abdominal exam: Present: soft, normal bowel sounds.  Absent: distended, 

tenderness, guarding, rebound, rigid, diminished bowel sounds





- Extremities Exam


Extremities exam: Present: normal inspection, full ROM





- Back Exam


Back exam: Present: normal inspection, full ROM.  Absent: tenderness, CVA 

tenderness (R), CVA tenderness (L), muscle spasm, paraspinal tenderness, 

vertebral tenderness, rash noted





- Neurological Exam


Neurological exam: Present: alert, oriented X3, normal gait





- Psychiatric


Psychiatric exam: Present: normal affect, normal mood





- Skin


Skin exam: Present: warm, dry, intact, normal color.  Absent: rash





<EUSEBIO RODRIGUEZ - Last Filed: 20 14:32>





ED Course


                                   Vital Signs











  20





  08:53


 


Temperature 97.9 F


 


Pulse Rate 83


 


Respiratory 18





Rate 


 


Blood Pressure 131/83


 


O2 Sat by Pulse 95





Oximetry 














- Reevaluation(s)


Reevaluation #1: 





20 14:48


Patient is speaking in full sentences with no signs of distress noted.





<EUSEBIO RODRIGUEZ - Last Filed: 20 14:32>


                                   Vital Signs











  20





  08:53 15:10


 


Temperature 97.9 F 


 


Pulse Rate 83 78


 


Respiratory 18 18





Rate  


 


Blood Pressure 131/83 


 


Blood Pressure  126/78





[Right]  


 


O2 Sat by Pulse 95 97





Oximetry  














<JAZZ CUELLO P - Last Filed: 01/10/20 07:31>





ED Medical Decision Making





- Medical Decision Making





This is a 25-year-old female that presents with influenza-like symptoms and UTI.

  Patient is stable and was examined by me.  Chest x-ray has been obtained and 

dictated by radiologist with normal exam.  Patient is notified of x-ray results 

with no questions noted.  Patient is over 48 hors of influenza symptoms so 

Tamiflu will not be given.  Patient was instructed with supportive care..   

Patient was instructed to increase hydration, rest and take Motrin for fever 

episodes.  Patient received motrin and tesslone perrls in the ED.  Vitals 

stable. Patient is nonfebrile and normal heart rate. Patient was instructed 

Follow-up with a primary care doctor in 3-5 days or if symptoms worsen and 

continue return to emergency room as soon as possible.  At time time of 

discharge, the patient does not seem toxic or ill in appearance.  No acute signs

 of distress noted.  Patient agrees to discharge treatment plan of care.  No 

further questions noted by the patient.





<EUSEBIO RODRIGUEZ - Last Filed: 20 14:32>





- Medical Decision Making








Attestation: Available for consultation





<JAZZ CUELLO P - Last Filed: 01/10/20 07:31>


Critical care attestation.: 


If time is entered above; I have spent that time in minutes in the direct care 

of this critically ill patient, excluding procedure time.








<EUSEBIO RODRIGUEZ - Last Filed: 20 14:32>


Critical care attestation.: 


If time is entered above; I have spent that time in minutes in the direct care 

of this critically ill patient, excluding procedure time.








<JAZZ CUELLO P - Last Filed: 01/10/20 07:31>





ED Disposition


Is pt being admited?: No


Does the pt Need Aspirin: No





<EUSEBIO RODRIGUEZ - Last Filed: 20 14:32>


Is pt being admited?: No





<JAZZ CUELLO P - Last Filed: 01/10/20 07:31>


Clinical Impression: 


 Influenza





Disposition: - TO HOME OR SELFCARE


Condition: Stable


Instructions:  Urinary Tract Infection in Women (ED), Influenza (ED)


Additional Instructions: 


Follow-up with a primary care doctor in 3-5 days or if symptoms worsen and 

continue return to emergency room as soon as possible. 





Increased rest, hydration, and take Motrin/Tylenol as prescribed for fever 

episode.


Prescriptions: 


Sulfamethoxazole/Trimethoprim [Bactrim DS TAB] 1 each PO BID #14 tablet


Ibuprofen [Motrin] 600 mg PO Q8H PRN #20 tablet


 PRN Reason: Pain/Fever


Benzonatate [Tessalon Perles] 100 mg PO Q8HR PRN #20 capsule


 PRN Reason: Cough


Referrals: 


PRIMARY CARE,MD [Primary Care Provider] - 3-5 Days


DEE DARNELL MD [Staff Physician] - 3-5 Days


[facility] [Outside] - 3-5 Days


Forms:  Work/School Release Form(ED)

## 2020-01-29 ENCOUNTER — HOSPITAL ENCOUNTER (EMERGENCY)
Dept: HOSPITAL 5 - ED | Age: 25
Discharge: HOME | End: 2020-01-29
Payer: COMMERCIAL

## 2020-01-29 VITALS — DIASTOLIC BLOOD PRESSURE: 84 MMHG | SYSTOLIC BLOOD PRESSURE: 132 MMHG

## 2020-01-29 DIAGNOSIS — L25.9: Primary | ICD-10-CM

## 2020-01-29 DIAGNOSIS — Z91.030: ICD-10-CM

## 2020-01-29 DIAGNOSIS — N39.0: ICD-10-CM

## 2020-01-29 DIAGNOSIS — Z79.899: ICD-10-CM

## 2020-01-29 DIAGNOSIS — I10: ICD-10-CM

## 2020-01-29 NOTE — EMERGENCY DEPARTMENT REPORT
ED Female  HPI





- General


Chief complaint: Skin/Abscess/Foreign Body


Stated complaint: STOMACH PAIN, INSECT BITE


Source: patient


Mode of arrival: Ambulatory


Limitations: No Limitations





- History of Present Illness


Initial comments: 





24 yo female presents with rash on stomach and malodorous urine.  No fever.  No 

vomiting.


MD Complaint: other (foul urine odor)


-: Gradual, days(s) (3)


Severity: mild


Consistency: constant


Improves with: none


Worsens with: urination





- Related Data


                                  Previous Rx's











 Medication  Instructions  Recorded  Last Taken  Type


 


Ibuprofen [Motrin 800 MG tab] 800 mg PO TID PRN #30 tablet 18 Unknown Rx


 


Lidocain2.5%/Prilocai2.5% [Emla] 5 gm TP ONCE #1 tube 18 Unknown Rx


 


oxyCODONE /ACETAMINOPHEN [Percocet 1 - 2 tab PO Q4HR PRN #30 tablet 18 Un

known Rx





5/325 mg]    


 


Benzonatate [Tessalon Perles] 100 mg PO Q8HR PRN #20 capsule 20 Unknown Rx


 


Ibuprofen [Motrin] 600 mg PO Q8H PRN #20 tablet 20 Unknown Rx


 


Sulfamethoxazole/Trimethoprim 1 each PO BID #14 tablet 20 Unknown Rx





[Bactrim DS TAB]    


 


cephALEXin [Keflex] 500 mg PO Q6HR 7 Days #28 capsule 20 Unknown Rx











                                    Allergies











Allergy/AdvReac Type Severity Reaction Status Date / Time


 


bee pollen Allergy  Swelling Verified 18 13:52














ED Review of Systems


ROS: 


Stated complaint: STOMACH PAIN, INSECT BITE


Other details as noted in HPI





Constitutional: denies: fever, malaise


Cardiovascular: denies: chest pain


Gastrointestinal: denies: abdominal pain, nausea, vomiting


Genitourinary: other (malodorous urine)





ED Past Medical Hx





- Past Medical History


Previous Medical History?: Yes


Hx Hypertension: Yes (gHTN)


Hx Heart Attack/AMI: No


Hx Congestive Heart Failure: No


Hx Diabetes: No


Hx Deep Vein Thrombosis: No


Hx Liver Disease: No


Hx Renal Disease: No


Hx Sickle Cell Disease: No


Hx Seizures: No


Hx Asthma: No


Hx COPD: No


Hx HIV: No





- Surgical History


Past Surgical History?: Yes


Additional Surgical History:  x 2-3.  





- Social History


Smoking Status: Never Smoker


Substance Use Type: None





- Medications


Home Medications: 


                                Home Medications











 Medication  Instructions  Recorded  Confirmed  Last Taken  Type


 


Ibuprofen [Motrin 800 MG tab] 800 mg PO TID PRN #30 tablet 18  Unknown Rx


 


Lidocain2.5%/Prilocai2.5% [Emla] 5 gm TP ONCE #1 tube 18  Unknown Rx


 


oxyCODONE /ACETAMINOPHEN [Percocet 1 - 2 tab PO Q4HR PRN #30 tablet 18  

Unknown Rx





5/325 mg]     


 


Benzonatate [Tessalon Perles] 100 mg PO Q8HR PRN #20 capsule 20  Unknown 

Rx


 


Ibuprofen [Motrin] 600 mg PO Q8H PRN #20 tablet 20  Unknown Rx


 


Sulfamethoxazole/Trimethoprim 1 each PO BID #14 tablet 20  Unknown Rx





[Bactrim DS TAB]     


 


cephALEXin [Keflex] 500 mg PO Q6HR 7 Days #28 capsule 20  Unknown Rx














ED Physical Exam





- General


Limitations: No Limitations


General appearance: alert, in no apparent distress





- Head


Head exam: Present: atraumatic, normocephalic





- Eye


Eye exam: Present: normal appearance





- ENT


ENT exam: Present: normal orophraynx, mucous membranes moist





- Neck


Neck exam: Present: normal inspection, full ROM





- GI/Abdominal


GI/Abdominal exam: Present: soft, other (4 cm circular urticarial type rash).  

Absent: distended, tenderness





- Extremities Exam


Extremities exam: Present: normal inspection





- Neurological Exam


Neurological exam: Present: alert





- Psychiatric


Psychiatric exam: Present: normal affect, normal mood





ED Course





                                   Vital Signs











  20





  15:37


 


Temperature 98.4 F


 


Pulse Rate 68


 


Respiratory 18





Rate 


 


Blood Pressure 132/84


 


O2 Sat by Pulse 100





Oximetry 














ED Medical Decision Making





- Medical Decision Making





1.  contact dermatitis recommended hydrocortisone cream





2.  UTI: keflex


Critical care attestation.: 


If time is entered above; I have spent that time in minutes in the direct care 

of this critically ill patient, excluding procedure time.








ED Disposition


Clinical Impression: 


 Contact dermatitis, UTI (urinary tract infection)





Disposition:  TO HOME OR SELFCARE


Is pt being admited?: No


Does the pt Need Aspirin: No


Condition: Stable


Instructions:  Contact Dermatitis (ED), Urinary Tract Infection in Women (ED)


Prescriptions: 


cephALEXin [Keflex] 500 mg PO Q6HR 7 Days #28 capsule


Referrals: 


DEE DARNELL MD [Staff Physician] - as needed